# Patient Record
Sex: FEMALE | Race: BLACK OR AFRICAN AMERICAN | NOT HISPANIC OR LATINO | URBAN - METROPOLITAN AREA
[De-identification: names, ages, dates, MRNs, and addresses within clinical notes are randomized per-mention and may not be internally consistent; named-entity substitution may affect disease eponyms.]

---

## 2018-11-19 VITALS
TEMPERATURE: 97 F | RESPIRATION RATE: 18 BRPM | SYSTOLIC BLOOD PRESSURE: 200 MMHG | DIASTOLIC BLOOD PRESSURE: 92 MMHG | HEART RATE: 85 BPM | OXYGEN SATURATION: 100 %

## 2018-11-19 RX ORDER — CHLORHEXIDINE GLUCONATE 213 G/1000ML
1 SOLUTION TOPICAL ONCE
Qty: 0 | Refills: 0 | Status: DISCONTINUED | OUTPATIENT
Start: 2018-11-20 | End: 2018-11-20

## 2018-11-19 NOTE — H&P ADULT - PSH
Background diabetic retinopathy  b/l surgery for correction 3-4 years ago reportedly  H/O:     History of partial hysterectomy  in

## 2018-11-19 NOTE — H&P ADULT - PMH
Afib    Diabetes    Hearing loss  b/l  HLD (hyperlipidemia)    HTN (hypertension)    MR (mitral regurgitation)  mild to moderate (Echo 10/23/18)

## 2018-11-19 NOTE — H&P ADULT - FAMILY HISTORY
Mother  Still living? Unknown  Family history of cardiomyopathy, Age at diagnosis: Age Unknown  Family history of diabetes mellitus, Age at diagnosis: Age Unknown     Father  Still living? Unknown  Family history of diabetes mellitus, Age at diagnosis: Age Unknown  Family history of hypertension, Age at diagnosis: Age Unknown

## 2018-11-19 NOTE — H&P ADULT - HISTORY OF PRESENT ILLNESS
*Confirm Medications on Arrival (especially in confirming change of meds with recent hospital admission)    67 y/o POOR HISTORIAN F with PMHx of B/L hearing loss, DM type I, HTN, HLD, recent 3-day admission to [facility] (secondary to SOB, high BP x 1-3 month ago with a change of medication), AF on Xarelto (pt reports was taken off it during last hospital admission and was consequently put on ASA 81mg and Metoprolol dose? daily since), mild to moderate MR and asthma who presented to their cardiologist c/o CP and RODRIGUEZ for the last few days with walking more than 2 city blocks/more than 1 flight of stairs with associated palpitations and B/L LE edema. Pt denies associated CP/SOB at rest, dizziness, diaphoresis, fatigue, orthopnea, PND and syncope.  Echo (10/23/18): EF 70%. Wall thickness was mildly increased. Mild to moderate MR. Mild TR. Left/Right atrium is dilated. Estimated peak pressure of pulmonary arteries was 45 mmHg.   NST (10/23/18): There was a small, mildly severe, reversible myocardial perfusion defect of the basal lateral wall. EF: 60%.   Due to pts risk factors, CCS Angina Class II Sx, abnormal NST, pt is referred for cardiac catheterization w/ possible intervention. *Confirm Medications on Arrival (especially in confirming change of meds with recent hospital admission)  *Confirm pt sxs (phone conversation was cut short and unable to reconnect with pt)    69 y/o POOR HISTORIAN F with PMHx of B/L hearing loss, DM type I, HTN, HLD, recent 3-day admission to [facility] (secondary to SOB, high BP x 1-3 month ago with a change of medication), AF on Xarelto (pt reports was taken off it during last hospital admission and was consequently put on ASA 81mg and Metoprolol dose? daily since), mild to moderate MR and asthma who presented to their cardiologist c/o CP and RODRIGUEZ for the last few days with walking more than 2 city blocks/more than 1 flight of stairs with associated palpitations and B/L LE edema. Pt denies associated CP/SOB at rest, dizziness, diaphoresis, fatigue, orthopnea, PND and syncope.  Echo (10/23/18): EF 70%. Wall thickness was mildly increased. Mild to moderate MR. Mild TR. Left/Right atrium is dilated. Estimated peak pressure of pulmonary arteries was 45 mmHg.   NST (10/23/18): There was a small, mildly severe, reversible myocardial perfusion defect of the basal lateral wall. EF: 60%.   Due to pts risk factors, CCS Angina Class II Sx, abnormal NST, pt is referred for cardiac catheterization w/ possible intervention. 67 y/o female, POOR HISTORIAN, with PMHx of HTN, hyperlipidemia, IDDM type I, CKD stage III (BUN/Crt 44/2.9 by labs 10/2018), pAfib (previously on xarelto which was discontinued 1mo ago 2/2 ? nosebleed, continued on ASA), dCHF (although patient denies) with recent 3-day admission to [facility] in 10/2018 for SOB and elevated blood pressure (patient unclear of what happened during hospital stay) who persented to the Cardiologist for follow up with c/o CP and RODRIGUEZ for the last few days with walking more than 2 city blocks/more than 1 flight of stairs with associated palpitations and B/L LE edema. Pt denies associated CP/SOB at rest, dizziness, diaphoresis, fatigue, orthopnea, PND and syncope.  Echo (10/23/18): EF 70%. Wall thickness was mildly increased. Mild to moderate MR. Mild TR. Left/Right atrium is dilated. Estimated peak pressure of pulmonary arteries was 45 mmHg.   NST (10/23/18): There was a small, mildly severe, reversible myocardial perfusion defect of the basal lateral wall. EF: 60%.   Due to pts risk factors, CCS Angina Class II Sx, abnormal NST, pt is referred for cardiac catheterization w/ possible intervention. 69 y/o female, POOR HISTORIAN, with PMHx of HTN, hyperlipidemia, IDDM type I, CKD stage III (BUN/Crt 44/2.9 by labs 10/2018), MERLYN (Hgb 10.2 by labs 10/2018), pAfib (previously on xarelto which was discontinued 1mo ago 2/2 ? nosebleed, continued on ASA), dCHF (EF 70%, although patient denies) with recent 3-day admission to [facility] in 10/2018 for SOB and elevated blood pressure (patient unclear of what happened during hospital stay) who presented to the Cardiologist for follow up with c/o CP and RODRIGUEZ for the last few days with walking more than 2 city blocks/more than 1 flight of stairs with associated palpitations and B/L LE edema. Pt denies associated CP/SOB at rest, dizziness, diaphoresis, fatigue, orthopnea, PND and syncope.  Echo (10/23/18): EF 70%. Wall thickness was mildly increased. Mild to moderate MR. Mild TR. Left/Right atrium is dilated. Estimated peak pressure of pulmonary arteries was 45 mmHg.  NST (10/23/18): There was a small, mildly severe, reversible myocardial perfusion defect of the basal lateral wall. EF: 60%.   In light of patients risk factors, CCS Angina Class III Sx, abnormal NST, pt is referred for cardiac catheterization w/ possible intervention.

## 2018-11-19 NOTE — H&P ADULT - ASSESSMENT
69 y/o female, POOR HISTORIAN, with PMHx of HTN, hyperlipidemia, IDDM type I, CKD stage III (BUN/Crt 44/2.9 by labs 10/2018), MERLYN (Hgb 10.2 by labs 10/2018), pAfib (previously on xarelto which was discontinued 1mo ago 2/2 ? nosebleed, continued on ASA), dCHF (EF 70%, although patient denies) with recent 3-day admission to [facility] in 10/2018 for SOB and elevated blood pressure who presents as follow up with the cardiologist with CCS Angina Class III Sx, abnormal NST, pt is referred for cardiac catheterization w/ possible intervention.     - Elevated BP on arrival, SBP >200s.  Patient did not take home BP meds.  Patient asked to take home Clonodine 0.1mg and Imdur 30mg   - known CKD Stage III.  BUN/Crt on arrival 34/2.66 (stable since Crt level in october 2018).     Risks & benefits of procedure and alternative therapy have been explained to the patient including but not limited to: allergic reaction, bleeding w/possible need for blood transfusion, infection, renal and vascular compromise, limb damage, arrhythmia, stroke, vessel dissection/perforation, Myocardial infarction, emergent CABG. Informed consent obtained and in chart. 69 y/o female, POOR HISTORIAN, with PMHx of HTN, hyperlipidemia, IDDM type I, CKD stage III (BUN/Crt 44/2.9 by labs 10/2018), MERLYN (Hgb 10.2 by labs 10/2018), pAfib (previously on xarelto which was discontinued 1mo ago 2/2 ? nosebleed, continued on ASA), dCHF (EF 70%, although patient denies) with recent 3-day admission to [facility] in 10/2018 for SOB and elevated blood pressure who presents as follow up with the cardiologist with CCS Angina Class III Sx, abnormal NST, pt is referred for cardiac catheterization w/ possible intervention.     - Elevated BP on arrival, SBP >200s.  Patient did not take home BP meds.  Patient asked to take home Clonodine 0.1mg and Imdur 30mg   - known CKD Stage III.  BUN/Crt on arrival 34/2.66 (stable since Crt level in october 2018).  - anemia stable.  H/H 9.8/32 (Hgb 10.2 in 10/2018).   - labs and case discussed w/ dr. Jackson  - Loaded with ASA 325mg and Plavix 600mg Po x1.      Risks & benefits of procedure and alternative therapy have been explained to the patient including but not limited to: allergic reaction, bleeding w/possible need for blood transfusion, infection, renal and vascular compromise, limb damage, arrhythmia, stroke, vessel dissection/perforation, Myocardial infarction, emergent CABG. Informed consent obtained and in chart.

## 2018-11-20 ENCOUNTER — OUTPATIENT (OUTPATIENT)
Dept: OUTPATIENT SERVICES | Facility: HOSPITAL | Age: 68
LOS: 1 days | Discharge: NURSING, REHAB OR LONG-TERM CARE | End: 2018-11-20
Payer: MEDICARE

## 2018-11-20 DIAGNOSIS — Z90.711 ACQUIRED ABSENCE OF UTERUS WITH REMAINING CERVICAL STUMP: Chronic | ICD-10-CM

## 2018-11-20 DIAGNOSIS — Z98.891 HISTORY OF UTERINE SCAR FROM PREVIOUS SURGERY: Chronic | ICD-10-CM

## 2018-11-20 DIAGNOSIS — E11.3299 TYPE 2 DIABETES MELLITUS WITH MILD NONPROLIFERATIVE DIABETIC RETINOPATHY WITHOUT MACULAR EDEMA, UNSPECIFIED EYE: Chronic | ICD-10-CM

## 2018-11-20 LAB
ANION GAP SERPL CALC-SCNC: 10 MMOL/L — SIGNIFICANT CHANGE UP (ref 5–17)
APTT BLD: 40.9 SEC — HIGH (ref 27.5–36.3)
BASOPHILS NFR BLD AUTO: 0.2 % — SIGNIFICANT CHANGE UP (ref 0–2)
BUN SERPL-MCNC: 34 MG/DL — HIGH (ref 7–23)
CALCIUM SERPL-MCNC: 9.1 MG/DL — SIGNIFICANT CHANGE UP (ref 8.4–10.5)
CHLORIDE SERPL-SCNC: 102 MMOL/L — SIGNIFICANT CHANGE UP (ref 96–108)
CHOLEST SERPL-MCNC: 123 MG/DL — SIGNIFICANT CHANGE UP (ref 10–199)
CK MB CFR SERPL CALC: 2 NG/ML — SIGNIFICANT CHANGE UP (ref 0–6.7)
CO2 SERPL-SCNC: 24 MMOL/L — SIGNIFICANT CHANGE UP (ref 22–31)
CREAT SERPL-MCNC: 2.66 MG/DL — HIGH (ref 0.5–1.3)
CRP SERPL-MCNC: 0.04 MG/DL — SIGNIFICANT CHANGE UP (ref 0–0.4)
EOSINOPHIL NFR BLD AUTO: 7.2 % — HIGH (ref 0–6)
GLUCOSE BLDC GLUCOMTR-MCNC: 202 MG/DL — HIGH (ref 70–99)
GLUCOSE BLDC GLUCOMTR-MCNC: 78 MG/DL — SIGNIFICANT CHANGE UP (ref 70–99)
GLUCOSE SERPL-MCNC: 216 MG/DL — HIGH (ref 70–99)
HBA1C BLD-MCNC: 12 % — HIGH (ref 4–5.6)
HCT VFR BLD CALC: 32 % — LOW (ref 34.5–45)
HDLC SERPL-MCNC: 64 MG/DL — SIGNIFICANT CHANGE UP
HGB BLD-MCNC: 9.8 G/DL — LOW (ref 11.5–15.5)
INR BLD: 1.04 — SIGNIFICANT CHANGE UP (ref 0.88–1.16)
LIPID PNL WITH DIRECT LDL SERPL: 47 MG/DL — SIGNIFICANT CHANGE UP
LYMPHOCYTES # BLD AUTO: 30.3 % — SIGNIFICANT CHANGE UP (ref 13–44)
MCHC RBC-ENTMCNC: 22.5 PG — LOW (ref 27–34)
MCHC RBC-ENTMCNC: 30.6 G/DL — LOW (ref 32–36)
MCV RBC AUTO: 73.4 FL — LOW (ref 80–100)
MONOCYTES NFR BLD AUTO: 9.9 % — SIGNIFICANT CHANGE UP (ref 2–14)
NEUTROPHILS NFR BLD AUTO: 52.4 % — SIGNIFICANT CHANGE UP (ref 43–77)
PLATELET # BLD AUTO: 210 K/UL — SIGNIFICANT CHANGE UP (ref 150–400)
POTASSIUM SERPL-MCNC: 3.7 MMOL/L — SIGNIFICANT CHANGE UP (ref 3.5–5.3)
POTASSIUM SERPL-SCNC: 3.7 MMOL/L — SIGNIFICANT CHANGE UP (ref 3.5–5.3)
PROTHROM AB SERPL-ACNC: 11.8 SEC — SIGNIFICANT CHANGE UP (ref 10–12.9)
RBC # BLD: 4.36 M/UL — SIGNIFICANT CHANGE UP (ref 3.8–5.2)
RBC # FLD: 14.1 % — SIGNIFICANT CHANGE UP (ref 10.3–16.9)
SODIUM SERPL-SCNC: 136 MMOL/L — SIGNIFICANT CHANGE UP (ref 135–145)
TOTAL CHOLESTEROL/HDL RATIO MEASUREMENT: 1.9 RATIO — LOW (ref 3.3–7.1)
TRIGL SERPL-MCNC: 59 MG/DL — SIGNIFICANT CHANGE UP (ref 10–149)
WBC # BLD: 5.2 K/UL — SIGNIFICANT CHANGE UP (ref 3.8–10.5)
WBC # FLD AUTO: 5.2 K/UL — SIGNIFICANT CHANGE UP (ref 3.8–10.5)

## 2018-11-20 PROCEDURE — 83036 HEMOGLOBIN GLYCOSYLATED A1C: CPT

## 2018-11-20 PROCEDURE — 86140 C-REACTIVE PROTEIN: CPT

## 2018-11-20 PROCEDURE — 93458 L HRT ARTERY/VENTRICLE ANGIO: CPT | Mod: 26

## 2018-11-20 PROCEDURE — 93005 ELECTROCARDIOGRAM TRACING: CPT

## 2018-11-20 PROCEDURE — 93010 ELECTROCARDIOGRAM REPORT: CPT

## 2018-11-20 PROCEDURE — 93458 L HRT ARTERY/VENTRICLE ANGIO: CPT

## 2018-11-20 PROCEDURE — 82962 GLUCOSE BLOOD TEST: CPT

## 2018-11-20 PROCEDURE — 85730 THROMBOPLASTIN TIME PARTIAL: CPT

## 2018-11-20 PROCEDURE — 82550 ASSAY OF CK (CPK): CPT

## 2018-11-20 PROCEDURE — 82553 CREATINE MB FRACTION: CPT

## 2018-11-20 PROCEDURE — 85025 COMPLETE CBC W/AUTO DIFF WBC: CPT

## 2018-11-20 PROCEDURE — 80061 LIPID PANEL: CPT

## 2018-11-20 PROCEDURE — 85610 PROTHROMBIN TIME: CPT

## 2018-11-20 PROCEDURE — C1894: CPT

## 2018-11-20 PROCEDURE — 80048 BASIC METABOLIC PNL TOTAL CA: CPT

## 2018-11-20 PROCEDURE — C1769: CPT

## 2018-11-20 PROCEDURE — C1887: CPT

## 2018-11-20 RX ORDER — GABAPENTIN 400 MG/1
1 CAPSULE ORAL
Qty: 0 | Refills: 0 | COMMUNITY

## 2018-11-20 RX ORDER — DEXTROSE 50 % IN WATER 50 %
25 SYRINGE (ML) INTRAVENOUS ONCE
Qty: 0 | Refills: 0 | Status: DISCONTINUED | OUTPATIENT
Start: 2018-11-20 | End: 2018-11-20

## 2018-11-20 RX ORDER — HYDRALAZINE HCL 50 MG
10 TABLET ORAL ONCE
Qty: 0 | Refills: 0 | Status: COMPLETED | OUTPATIENT
Start: 2018-11-20 | End: 2018-11-20

## 2018-11-20 RX ORDER — ISOSORBIDE MONONITRATE 60 MG/1
1 TABLET, EXTENDED RELEASE ORAL
Qty: 0 | Refills: 0 | COMMUNITY

## 2018-11-20 RX ORDER — INSULIN LISPRO 100/ML
VIAL (ML) SUBCUTANEOUS ONCE
Qty: 0 | Refills: 0 | Status: COMPLETED | OUTPATIENT
Start: 2018-11-20 | End: 2018-11-20

## 2018-11-20 RX ORDER — INSULIN ASPART 100 [IU]/ML
6 INJECTION, SOLUTION SUBCUTANEOUS
Qty: 0 | Refills: 0 | COMMUNITY

## 2018-11-20 RX ORDER — LORATADINE 10 MG/1
1 TABLET ORAL
Qty: 0 | Refills: 0 | COMMUNITY

## 2018-11-20 RX ORDER — AMLODIPINE BESYLATE 2.5 MG/1
1 TABLET ORAL
Qty: 0 | Refills: 0 | COMMUNITY

## 2018-11-20 RX ORDER — SODIUM CHLORIDE 9 MG/ML
1000 INJECTION, SOLUTION INTRAVENOUS
Qty: 0 | Refills: 0 | Status: DISCONTINUED | OUTPATIENT
Start: 2018-11-20 | End: 2018-11-20

## 2018-11-20 RX ORDER — ASPIRIN/CALCIUM CARB/MAGNESIUM 324 MG
1 TABLET ORAL
Qty: 0 | Refills: 0 | COMMUNITY

## 2018-11-20 RX ORDER — FUROSEMIDE 40 MG
1 TABLET ORAL
Qty: 0 | Refills: 0 | COMMUNITY

## 2018-11-20 RX ORDER — POTASSIUM CHLORIDE 20 MEQ
20 PACKET (EA) ORAL ONCE
Qty: 0 | Refills: 0 | Status: DISCONTINUED | OUTPATIENT
Start: 2018-11-20 | End: 2018-11-20

## 2018-11-20 RX ORDER — METOPROLOL TARTRATE 50 MG
1 TABLET ORAL
Qty: 0 | Refills: 0 | COMMUNITY

## 2018-11-20 RX ORDER — ASPIRIN/CALCIUM CARB/MAGNESIUM 324 MG
325 TABLET ORAL ONCE
Qty: 0 | Refills: 0 | Status: COMPLETED | OUTPATIENT
Start: 2018-11-20 | End: 2018-11-20

## 2018-11-20 RX ORDER — INSULIN LISPRO 100/ML
25 VIAL (ML) SUBCUTANEOUS
Qty: 0 | Refills: 0 | COMMUNITY

## 2018-11-20 RX ORDER — PANTOPRAZOLE SODIUM 20 MG/1
1 TABLET, DELAYED RELEASE ORAL
Qty: 0 | Refills: 0 | COMMUNITY

## 2018-11-20 RX ORDER — METOPROLOL TARTRATE 50 MG
200 TABLET ORAL ONCE
Qty: 0 | Refills: 0 | Status: COMPLETED | OUTPATIENT
Start: 2018-11-20 | End: 2018-11-20

## 2018-11-20 RX ORDER — GLUCAGON INJECTION, SOLUTION 0.5 MG/.1ML
1 INJECTION, SOLUTION SUBCUTANEOUS ONCE
Qty: 0 | Refills: 0 | Status: DISCONTINUED | OUTPATIENT
Start: 2018-11-20 | End: 2018-11-20

## 2018-11-20 RX ORDER — ATORVASTATIN CALCIUM 80 MG/1
1 TABLET, FILM COATED ORAL
Qty: 0 | Refills: 0 | COMMUNITY

## 2018-11-20 RX ORDER — AMLODIPINE BESYLATE 2.5 MG/1
10 TABLET ORAL ONCE
Qty: 0 | Refills: 0 | Status: COMPLETED | OUTPATIENT
Start: 2018-11-20 | End: 2018-11-20

## 2018-11-20 RX ORDER — DEXTROSE 50 % IN WATER 50 %
12.5 SYRINGE (ML) INTRAVENOUS ONCE
Qty: 0 | Refills: 0 | Status: DISCONTINUED | OUTPATIENT
Start: 2018-11-20 | End: 2018-11-20

## 2018-11-20 RX ORDER — SODIUM CHLORIDE 9 MG/ML
500 INJECTION INTRAMUSCULAR; INTRAVENOUS; SUBCUTANEOUS
Qty: 0 | Refills: 0 | Status: DISCONTINUED | OUTPATIENT
Start: 2018-11-20 | End: 2018-11-20

## 2018-11-20 RX ORDER — DEXTROSE 50 % IN WATER 50 %
15 SYRINGE (ML) INTRAVENOUS ONCE
Qty: 0 | Refills: 0 | Status: DISCONTINUED | OUTPATIENT
Start: 2018-11-20 | End: 2018-11-20

## 2018-11-20 RX ORDER — CLOPIDOGREL BISULFATE 75 MG/1
600 TABLET, FILM COATED ORAL ONCE
Qty: 0 | Refills: 0 | Status: COMPLETED | OUTPATIENT
Start: 2018-11-20 | End: 2018-11-20

## 2018-11-20 RX ADMIN — SODIUM CHLORIDE 75 MILLILITER(S): 9 INJECTION INTRAMUSCULAR; INTRAVENOUS; SUBCUTANEOUS at 08:35

## 2018-11-20 RX ADMIN — Medication 200 MILLIGRAM(S): at 15:54

## 2018-11-20 RX ADMIN — Medication 4: at 09:24

## 2018-11-20 RX ADMIN — AMLODIPINE BESYLATE 10 MILLIGRAM(S): 2.5 TABLET ORAL at 15:44

## 2018-11-20 RX ADMIN — Medication 325 MILLIGRAM(S): at 09:25

## 2018-11-20 RX ADMIN — Medication 10 MILLIGRAM(S): at 16:09

## 2018-11-20 RX ADMIN — CLOPIDOGREL BISULFATE 600 MILLIGRAM(S): 75 TABLET, FILM COATED ORAL at 09:24

## 2018-11-20 NOTE — PROGRESS NOTE ADULT - SUBJECTIVE AND OBJECTIVE BOX
Interventional Cardiology PA SDA Discharge Note    Patient without complaints. Ambulated and voided without difficulties    Afebrile, VSS /90,  took her own imdur and clonidine today upon arrival to cath holding    Ext:    Right Radial : no  hematoma,  no   bleeding, dressing; C/D/I      Pulses:    intact RAD 2+      A/P:        s/p cardiac cath 11/20/18 revealing non obstructive CAD, R radial access  in room given enalapril 2.5 mg IVP x 2 for SBP in 200's - asymptomatic          1.	Stable for discharge as per attending Dr. Jackson  after bed rest, pt voids, groin/wrist stable and 30 minutes of ambulation.  2.	Follow-up with PMD/Cardiologist Dr Jackson  in 1-2 weeks  3.	Discharged forms signed and copies in chart Interventional Cardiology PA SDA Discharge Note    Patient without complaints. Ambulated and voided without difficulties    Afebrile, VSS /90,  took her own imdur and clonidine today upon arrival to cath holding    Ext:    Right Radial : no  hematoma,  no   bleeding, dressing; C/D/I      Pulses:    intact RAD 2+      A/P:  69 y/o female, POOR HISTORIAN, with PMHx of HTN, hyperlipidemia, IDDM type I, CKD stage III (BUN/Crt 44/2.9 by labs 10/2018), MERLYN (Hgb 10.2 by labs 10/2018), pAfib (previously on xarelto which was discontinued 1mo ago 2/2 ? nosebleed, continued on ASA), dCHF (EF 70%, although patient denies) with recent 3-day admission to [facility] in 10/2018 for SOB and elevated blood pressure who presents as follow up with the cardiologist with CCS Angina Class III Sx, abnormal NST, pt is referred for cardiac catheterization w/ possible intervention.       s/p cardiac cath 11/20/18 revealing non obstructive CAD, R radial access  in room given enalapril 2.5 mg IVP x 2 for SBP in 200's - asymptomatic        1.	Stable for discharge as per attending Dr. Jackson  after bed rest, pt voids, groin/wrist stable and 30 minutes of ambulation.  2.	Follow-up with PMD/Cardiologist Dr Jackson  in 1-2 weeks  3.	Discharged forms signed and copies in chart Interventional Cardiology PA SDA Discharge Note    Patient without complaints. Ambulated and voided without difficulties    Afebrile, VSS /90 -225/115, HR 70's,  took her own imdur and clonidine today upon arrival to cath holding, in room s/p enalapril 2.5 mg IVP x 2 with temporary decreased in BP, post cath given home metoprolol ER 200mg, home norvasc 10mg and hydralazine 10mg IVP as d/w Dr Jackson    Ext:    Right Radial : no  hematoma,  no   bleeding, dressing; C/D/I      Pulses:    intact RAD 2+      A/P:  67 y/o female, POOR HISTORIAN, with PMHx of HTN, hyperlipidemia, IDDM type I, CKD stage III (BUN/Crt 44/2.9 by labs 10/2018), MERLYN (Hgb 10.2 by labs 10/2018), pAfib (previously on xarelto which was discontinued 1mo ago 2/2 ? nosebleed, continued on ASA), dCHF (EF 70%, although patient denies) with recent 3-day admission to [facility] in 10/2018 for SOB and elevated blood pressure who presents as follow up with the cardiologist with CCS Angina Class III Sx, abnormal NST, pt is referred for cardiac catheterization w/ possible intervention.       s/p cardiac cath 11/20/18 revealing non obstructive CAD, R radial access  in room given enalapril 2.5 mg IVP x 2 for SBP in 200's - asymptomatic        1.	Stable for discharge as per attending Dr. Jackson  after bed rest, pt voids, groin/wrist stable and 30 minutes of ambulation.  2.	Follow-up with PMD/Cardiologist Dr Jackson  in 1-2 weeks  3.	Discharged forms signed and copies in chart Interventional Cardiology PA SDA Discharge Note    Patient without complaints. Ambulated and voided without difficulties    Afebrile, VS /90 -225/115, HR 70's,  took her own imdur and clonidine today upon arrival to cath holding, in room s/p enalapril 2.5 mg IVP x 2 with temporary decreased in BP, post cath given home metoprolol ER 200mg, home norvasc 10mg and hydralazine 10mg IVP as d/w Dr Jackson    Ext:    Right Radial : no  hematoma,  no   bleeding, dressing; C/D/I      Pulses:    intact RAD 2+      A/P:  67 y/o female, POOR HISTORIAN, with PMHx of HTN, hyperlipidemia, IDDM type I, CKD stage III (BUN/Crt 44/2.9 by labs 10/2018), MERLYN (Hgb 10.2 by labs 10/2018), pAfib (previously on xarelto which was discontinued 1mo ago 2/2 ? nosebleed, continued on ASA), dCHF (EF 70%, although patient denies) with recent 3-day admission to [facility] in 10/2018 for SOB and elevated blood pressure who presents as follow up with the cardiologist with CCS Angina Class III Sx, abnormal NST, pt is referred for cardiac catheterization w/ possible intervention.       s/p cardiac cath 11/20/18 revealing non obstructive CAD, R radial access  in room given enalapril 2.5 mg IVP x 2 for SBP in 200's - asymptomatic        1.	Stable for discharge as per attending Dr. Jacksno  after bed rest, pt voids, groin/wrist stable and 30 minutes of ambulation.  2.	Follow-up with PMD/Cardiologist Dr Jackson  in 1-2 weeks  3.	Discharged forms signed and copies in chart

## 2022-02-10 NOTE — H&P ADULT - GASTROINTESTINAL DETAILS
Referral received for Phase II Cardiac Rehab.  Staff reviewed chart and patient has qualifying diagnosis for Phase II Cardiac Rehab.  Staff will contact patient in regards to scheduling or referring to another facility.   
no distention/soft/normal/nontender